# Patient Record
Sex: FEMALE | Race: WHITE | NOT HISPANIC OR LATINO | Employment: UNEMPLOYED | URBAN - NONMETROPOLITAN AREA
[De-identification: names, ages, dates, MRNs, and addresses within clinical notes are randomized per-mention and may not be internally consistent; named-entity substitution may affect disease eponyms.]

---

## 2017-01-04 ENCOUNTER — HOSPITAL ENCOUNTER (OUTPATIENT)
Dept: LAB | Facility: MEDICAL CENTER | Age: 52
End: 2017-01-04
Attending: COLON & RECTAL SURGERY
Payer: COMMERCIAL

## 2017-01-04 LAB
ANISOCYTOSIS BLD QL SMEAR: ABNORMAL
BASOPHILS # BLD AUTO: 0.05 K/UL (ref 0–0.12)
BASOPHILS NFR BLD AUTO: 0.7 % (ref 0–1.8)
COMMENT 1642: NORMAL
EOSINOPHIL # BLD: 0.14 K/UL (ref 0–0.51)
EOSINOPHIL NFR BLD AUTO: 1.9 % (ref 0–6.9)
ERYTHROCYTE [DISTWIDTH] IN BLOOD BY AUTOMATED COUNT: 51 FL (ref 35.9–50)
GIANT PLATELETS BLD QL SMEAR: NORMAL
HCT VFR BLD AUTO: 38.2 % (ref 37–47)
HGB BLD-MCNC: 11.4 G/DL (ref 12–16)
IMM GRANULOCYTES # BLD AUTO: 0.02 K/UL (ref 0–0.11)
IMM GRANULOCYTES NFR BLD AUTO: 0.3 % (ref 0–0.9)
LYMPHOCYTES # BLD: 2.16 K/UL (ref 1–4.8)
LYMPHOCYTES NFR BLD AUTO: 29.8 % (ref 22–41)
MCH RBC QN AUTO: 25.7 PG (ref 27–33)
MCHC RBC AUTO-ENTMCNC: 29.8 G/DL (ref 33.6–35)
MCV RBC AUTO: 86.2 FL (ref 81.4–97.8)
MICROCYTES BLD QL SMEAR: ABNORMAL
MONOCYTES # BLD: 0.56 K/UL (ref 0–0.85)
MONOCYTES NFR BLD AUTO: 7.7 % (ref 0–13.4)
MORPHOLOGY BLD-IMP: NORMAL
NEUTROPHILS # BLD: 4.33 K/UL (ref 2–7.15)
NEUTROPHILS NFR BLD AUTO: 59.6 % (ref 44–72)
NRBC # BLD AUTO: 0 K/UL
NRBC BLD-RTO: 0 /100 WBC
OVALOCYTES BLD QL SMEAR: NORMAL
PLATELET # BLD AUTO: 276 K/UL (ref 164–446)
PLATELET BLD QL SMEAR: NORMAL
PMV BLD AUTO: 11.7 FL (ref 9–12.9)
POIKILOCYTOSIS BLD QL SMEAR: NORMAL
RBC # BLD AUTO: 4.43 M/UL (ref 4.2–5.4)
RBC BLD AUTO: PRESENT
WBC # BLD AUTO: 7.3 K/UL (ref 4.8–10.8)

## 2017-01-04 PROCEDURE — 85025 COMPLETE CBC W/AUTO DIFF WBC: CPT

## 2017-01-04 PROCEDURE — 36415 COLL VENOUS BLD VENIPUNCTURE: CPT

## 2017-01-09 ENCOUNTER — APPOINTMENT (OUTPATIENT)
Dept: ADMISSIONS | Facility: MEDICAL CENTER | Age: 52
End: 2017-01-09
Attending: COLON & RECTAL SURGERY
Payer: COMMERCIAL

## 2017-01-09 RX ORDER — ESTRADIOL 0.5 MG/1
0.5 TABLET ORAL EVERY EVENING
COMMUNITY

## 2017-01-13 ENCOUNTER — HOSPITAL ENCOUNTER (OUTPATIENT)
Facility: MEDICAL CENTER | Age: 52
End: 2017-01-13
Attending: COLON & RECTAL SURGERY | Admitting: COLON & RECTAL SURGERY
Payer: COMMERCIAL

## 2017-01-13 VITALS
TEMPERATURE: 98.1 F | BODY MASS INDEX: 26.37 KG/M2 | DIASTOLIC BLOOD PRESSURE: 73 MMHG | RESPIRATION RATE: 16 BRPM | WEIGHT: 167.99 LBS | SYSTOLIC BLOOD PRESSURE: 127 MMHG | OXYGEN SATURATION: 95 % | HEART RATE: 101 BPM | HEIGHT: 67 IN

## 2017-01-13 PROBLEM — Z00.8 RECTAL EXAM: Status: ACTIVE | Noted: 2017-01-13

## 2017-01-13 LAB
ERYTHROCYTE [DISTWIDTH] IN BLOOD BY AUTOMATED COUNT: 47.5 FL (ref 35.9–50)
HCG SERPL QL: NEGATIVE
HCT VFR BLD AUTO: 37.7 % (ref 37–47)
HGB BLD-MCNC: 11.9 G/DL (ref 12–16)
MCH RBC QN AUTO: 25.6 PG (ref 27–33)
MCHC RBC AUTO-ENTMCNC: 31.6 G/DL (ref 33.6–35)
MCV RBC AUTO: 81.3 FL (ref 81.4–97.8)
PLATELET # BLD AUTO: 294 K/UL (ref 164–446)
PMV BLD AUTO: 11 FL (ref 9–12.9)
RBC # BLD AUTO: 4.64 M/UL (ref 4.2–5.4)
WBC # BLD AUTO: 7.6 K/UL (ref 4.8–10.8)

## 2017-01-13 PROCEDURE — 85027 COMPLETE CBC AUTOMATED: CPT

## 2017-01-13 PROCEDURE — 84703 CHORIONIC GONADOTROPIN ASSAY: CPT

## 2017-01-13 PROCEDURE — A9270 NON-COVERED ITEM OR SERVICE: HCPCS

## 2017-01-13 PROCEDURE — A4606 OXYGEN PROBE USED W OXIMETER: HCPCS | Performed by: COLON & RECTAL SURGERY

## 2017-01-13 PROCEDURE — 160046 HCHG PACU - 1ST 60 MINS PHASE II: Performed by: COLON & RECTAL SURGERY

## 2017-01-13 PROCEDURE — 160025 RECOVERY II MINUTES (STATS): Performed by: COLON & RECTAL SURGERY

## 2017-01-13 PROCEDURE — 160204 HCHG ENDO MINUTES - 1ST 30 MINS LEVEL 5: Performed by: COLON & RECTAL SURGERY

## 2017-01-13 PROCEDURE — 700102 HCHG RX REV CODE 250 W/ 637 OVERRIDE(OP)

## 2017-01-13 PROCEDURE — 160002 HCHG RECOVERY MINUTES (STAT): Performed by: COLON & RECTAL SURGERY

## 2017-01-13 PROCEDURE — 160047 HCHG PACU  - EA ADDL 30 MINS PHASE II: Performed by: COLON & RECTAL SURGERY

## 2017-01-13 PROCEDURE — 160048 HCHG OR STATISTICAL LEVEL 1-5: Performed by: COLON & RECTAL SURGERY

## 2017-01-13 PROCEDURE — 700111 HCHG RX REV CODE 636 W/ 250 OVERRIDE (IP)

## 2017-01-13 PROCEDURE — 160009 HCHG ANES TIME/MIN: Performed by: COLON & RECTAL SURGERY

## 2017-01-13 PROCEDURE — 110371 HCHG SHELL REV 272: Performed by: COLON & RECTAL SURGERY

## 2017-01-13 PROCEDURE — 700101 HCHG RX REV CODE 250

## 2017-01-13 PROCEDURE — 700111 HCHG RX REV CODE 636 W/ 250 OVERRIDE (IP): Performed by: COLON & RECTAL SURGERY

## 2017-01-13 PROCEDURE — 501629 HCHG TUBE, LUKI TRAP STERILE DISP: Performed by: COLON & RECTAL SURGERY

## 2017-01-13 PROCEDURE — 160035 HCHG PACU - 1ST 60 MINS PHASE I: Performed by: COLON & RECTAL SURGERY

## 2017-01-13 RX ORDER — SODIUM CHLORIDE, SODIUM LACTATE, POTASSIUM CHLORIDE, CALCIUM CHLORIDE 600; 310; 30; 20 MG/100ML; MG/100ML; MG/100ML; MG/100ML
INJECTION, SOLUTION INTRAVENOUS
Status: DISCONTINUED | OUTPATIENT
Start: 2017-01-13 | End: 2017-01-13 | Stop reason: HOSPADM

## 2017-01-13 RX ORDER — OXYCODONE HCL 5 MG/5 ML
SOLUTION, ORAL ORAL
Status: COMPLETED
Start: 2017-01-13 | End: 2017-01-13

## 2017-01-13 RX ORDER — MIDAZOLAM HYDROCHLORIDE 1 MG/ML
INJECTION INTRAMUSCULAR; INTRAVENOUS
Status: DISCONTINUED
Start: 2017-01-13 | End: 2017-01-13 | Stop reason: HOSPADM

## 2017-01-13 RX ORDER — LIDOCAINE HYDROCHLORIDE 10 MG/ML
INJECTION, SOLUTION INFILTRATION; PERINEURAL
Status: COMPLETED
Start: 2017-01-13 | End: 2017-01-13

## 2017-01-13 RX ADMIN — LIDOCAINE HYDROCHLORIDE 0.2 ML: 10 INJECTION, SOLUTION INFILTRATION; PERINEURAL at 08:15

## 2017-01-13 RX ADMIN — OXYCODONE HYDROCHLORIDE 10 MG: 5 SOLUTION ORAL at 09:23

## 2017-01-13 RX ADMIN — SODIUM CHLORIDE, POTASSIUM CHLORIDE, SODIUM LACTATE AND CALCIUM CHLORIDE: 600; 310; 30; 20 INJECTION, SOLUTION INTRAVENOUS at 08:45

## 2017-01-13 ASSESSMENT — PAIN SCALES - GENERAL
PAINLEVEL_OUTOF10: 5
PAINLEVEL_OUTOF10: 5
PAINLEVEL_OUTOF10: 9
PAINLEVEL_OUTOF10: 8
PAINLEVEL_OUTOF10: 5
PAINLEVEL_OUTOF10: 9

## 2017-01-13 NOTE — DISCHARGE INSTRUCTIONS
ENDOSCOPY HOME CARE INSTRUCTIONS    COLONOSCOPY  1. If you received a barium enema, take a mild laxative such as dulcolax, Jaleesa's M.O., or Milk of Magnesia to clean out the barium.  2. Drink plenty of fluids. Eat a diet high in fiber (whole-grain breads, fresh fruit and vegetables).  3. You may notice a few drops of blood with your first bowel movement. If you develop any large amount of bleeding, black stools, a fever, or abdominal pain, call your doctor right away.  5. Don't drive or drink alcohol for 24 hours. The medication you received will make you too drowsy.  6. No heavy lifting, no Aspirin products or Aspirin for 5 days  7. Additional instructions:  The anus and rectum appear to be healing within normal limits. Plan warm baths and keep the bowels soft and moving. Expect long period of time to heal and for pain to resolve due to opioid tolerance. The prep for the colonoscopy was not adequate to complete the colonoscopy. Please contact Dr. Hale's office to schedule a Barium enema study as a substitute in the next 2 months. Please call [phone] for followup appointment in 4 weeks.   MD Hale [phone]    You should call [phone] if you develop problems with breathing or chest pain.  If any questions arise, call your doctor. If your doctor is not available, please feel free to call [phone]. You can also call the HEALTH HOTLINE open 24 hours/day, 7 days/week and speak to a nurse at [phone], or toll free [phone].    Depression / Suicide Risk    As you are discharged from this [facility] facility, it is important to learn how to keep safe from harming yourself.    Recognize the warning signs:  · Abrupt changes in personality, positive or negative- including increase in energy   · Giving away possessions  · Change in eating patterns- significant weight changes-  positive or negative  · Change in sleeping patterns- unable to sleep or sleeping all the time   · Unwillingness or inability to  communicate  · Depression  · Unusual sadness, discouragement and loneliness  · Talk of wanting to die  · Neglect of personal appearance   · Rebelliousness- reckless behavior  · Withdrawal from people/activities they love  · Confusion- inability to concentrate     If you or a loved one observes any of these behaviors or has concerns about self-harm, here's what you can do:  · Talk about it- your feelings and reasons for harming yourself  · Remove any means that you might use to hurt yourself (examples: pills, rope, extension cords, firearm)  · Get professional help from the community (Mental Health, Substance Abuse, psychological counseling)  · Do not be alone:Call your Safe Contact- someone whom you trust who will be there for you.  · Call your local CRISIS HOTLINE 699-2740 or 651-437-3618  · Call your local Children's Mobile Crisis Response Team [place] (639) 936-0977 or www.[employer]  · Call the toll free National Suicide Prevention Hotlines   · National Suicide Prevention Lifeline 329-836-GVLV (3169)  · National Hope Line Network 800-SUICIDE (535-8408)    I acknowledge receipt and understanding of these Home Care Instructions.

## 2017-01-13 NOTE — OR NURSING
0903 To PACU from OR on [facility]. All bedside rails up for safe transfer. Sleeping with lma in place. Even non labored breathing. Skin pink warm dry. Lungs clear bilaterally.   0908 lma removed, spontaneous even non labored breathing. Skin pink warm dry. arousable and calling. C/o chronic abdominal pain, plan to medicate. Denies nausea.   0915 awake and alert.   0930 tolerated sips of water.   0940 Deep breathing. Strong cough. IV Fluids open.  0957 pt meets criteria for stage 2. Report given to katrina nicholson

## 2017-01-13 NOTE — IP AVS SNAPSHOT
" After Visit Summary                                                                                                                Name:Selina Piper  Medical Record Number:[id number]  CSN: [id number]    YOB: 1965   Age: 51 y.o.  Sex: female  HT:1.714 m (5' 7.48\") WT: 76.2 kg (167 lb 15.9 oz)          Admit Date: 1/13/2017     Discharge Date:   Today's Date: 1/13/2017  Attending Doctor:  Jg Hale M.D.                  Allergies:  Tape                Discharge Instructions       ENDOSCOPY HOME CARE INSTRUCTIONS    COLONOSCOPY  1. If you received a barium enema, take a mild laxative such as dulcolax, Jaleesa's M.O., or Milk of Magnesia to clean out the barium.  2. Drink plenty of fluids. Eat a diet high in fiber (whole-grain breads, fresh fruit and vegetables).  3. You may notice a few drops of blood with your first bowel movement. If you develop any large amount of bleeding, black stools, a fever, or abdominal pain, call your doctor right away.  5. Don't drive or drink alcohol for 24 hours. The medication you received will make you too drowsy.  6. No heavy lifting, no Aspirin products or Aspirin for 5 days  7. Additional instructions:  The anus and rectum appear to be healing within normal limits. Plan warm baths and keep the bowels soft and moving. Expect long period of time to heal and for pain to resolve due to opioid tolerance. The prep for the colonoscopy was not adequate to complete the colonoscopy. Please contact Dr. Hale's office to schedule a Barium enema study as a substitute in the next 2 months. Please call [phone] for followup appointment in 4 weeks.   MD Hale [phone]    You should call [phone] if you develop problems with breathing or chest pain.  If any questions arise, call your doctor. If your doctor is not available, please feel free to call [phone]. You can also call the [employer] HOTLINE open 24 hours/day, 7 days/week and speak to a nurse at [phone], or toll free " [phone].    Depression / Suicide Risk    As you are discharged from this [facility] facility, it is important to learn how to keep safe from harming yourself.    Recognize the warning signs:  · Abrupt changes in personality, positive or negative- including increase in energy   · Giving away possessions  · Change in eating patterns- significant weight changes-  positive or negative  · Change in sleeping patterns- unable to sleep or sleeping all the time   · Unwillingness or inability to communicate  · Depression  · Unusual sadness, discouragement and loneliness  · Talk of wanting to die  · Neglect of personal appearance   · Rebelliousness- reckless behavior  · Withdrawal from people/activities they love  · Confusion- inability to concentrate     If you or a loved one observes any of these behaviors or has concerns about self-harm, here's what you can do:  · Talk about it- your feelings and reasons for harming yourself  · Remove any means that you might use to hurt yourself (examples: pills, rope, extension cords, firearm)  · Get professional help from the community (Mental Health, Substance Abuse, psychological counseling)  · Do not be alone:Call your Safe Contact- someone whom you trust who will be there for you.  · Call your local CRISIS HOTLINE 206-8616 or 686-030-1403  · Call your local Children's Mobile Crisis Response Team [place] (527) 377-6910 or www.Currently  · Call the toll free National Suicide Prevention Hotlines   · National Suicide Prevention Lifeline 396-316-PVVZ (6868)  · National Hope Line Network 800-SUICIDE (025-8886)    I acknowledge receipt and understanding of these Home Care Instructions.       Medication List      CONTINUE taking these medications        Instructions    alprazolam 1 MG Tabs   Commonly known as:  XANAX    Take 1 mg by mouth at bedtime as needed for Sleep.   Dose:  1 mg       amitriptyline 10 MG Tabs   Commonly known as:  ELAVIL    Take 20 mg by mouth  every evening.   Dose:  20 mg       clonazepam 0.5 MG Tabs   Commonly known as:  KLONOPIN    Take 0.25 mg by mouth every evening.   Dose:  0.25 mg       ESTRACE 0.5 MG tablet   Generic drug:  estradiol    Take 0.5 mg by mouth every evening.   Dose:  0.5 mg       fluoxetine 20 MG Caps   Commonly known as:  PROZAC    Take 20 mg by mouth every evening.   Dose:  20 mg       MORPHINE SULFATE    Take 30 mg by mouth as needed. Taking 3 pills as needed   Dose:  30 mg       PRILOSEC 20 MG delayed-release capsule   Generic drug:  omeprazole    Take  by mouth every evening. Once daily               Medication Information     Above and/or attached are the medications your physician expects you to take upon discharge. Review all of your home medications and newly ordered medications with your doctor and/or pharmacist. Follow medication instructions as directed by your doctor and/or pharmacist. Please keep your medication list with you and share with your physician. Update the information when medications are discontinued, doses are changed, or new medications (including over-the-counter products) are added; and carry medication information at all times in the event of emergency situations.        Resources     Quit Smoking / Tobacco Use:    I understand the use of any tobacco products increases my chance of suffering from future heart disease or stroke and could cause other illnesses which may shorten my life. Quitting the use of tobacco products is the single most important thing I can do to improve my health. For further information on smoking / tobacco cessation call a Toll Free Quit Line at 1-998.131.3045 (*National Cancer [place]) or 1-689.505.4275 (American Lung Association) or you can access the web based program at www.lungusa.org.    [place] Tobacco Users Help Line:  (187) 198-2930       Toll Free: 1-128.379.4792  Quit Tobacco Program [facility] (331)734-8476    Crisis Hotline:    National Crisis  Hotline:  8-838-KBEWOUT or 1-416.240.5063    [place] Crisis Hotline:    1-102.882.1055 or 014-511-4799    Discharge Survey:   Thank you for choosing [facility]. We hope we did everything we could to make your hospital stay a pleasant one. You may be receiving a survey and we would appreciate your time and participation in answering the questions. Your input is very valuable to us in our efforts to improve our service to our patients and their families.            Signatures     My signature on this form indicates that:    1. I acknowledge receipt and understanding of these Home Care Instruction.  2. My questions regarding this information have been answered to my satisfaction.  3. I have formulated a plan with my discharge nurse to obtain my prescribed medications for home.    __________________________________      __________________________________                   Patient Signature                                 Guardian/Responsible Adult Signature      __________________________________                 __________       ________                       Nurse Signature                                               Date                 Time

## 2017-01-13 NOTE — OR NURSING
1000: Pt states does not want family brought back to stage 2 or have him sign for instructions.  1030: Wheeled out to home

## 2017-01-13 NOTE — OP REPORT
DATE OF SERVICE:  01/13/2017    PREOPERATIVE DIAGNOSES:  1.  Rectal bleeding.  2.  History of anemia.  3.  Anal pain and bleeding.  4.  History of hemorrhoidectomy.  5.  History of incomplete colonoscopy due to inadequate prep.  6.  Opioid tolerance.    POSTOPERATIVE DIAGNOSES:  1.  Incomplete colonoscopy due to poor prep.  2.  Anus and rectum healing within normal limits.    OPERATION PERFORMED:  1.  Examination under anesthesia.  2.  Colonoscopy to transverse colon, aborted due to insufficient prep.    SURGEON:  Jg Hale MD    ANESTHESIOLOGIST:  Sherwin Bettencourt MD    INDICATIONS FOR PROCEDURE:  The patient is a 51-year-old female with a history   of mild anemia, opioid tolerance, rectal bleeding, who underwent a   hemorrhoidectomy procedure and has had persistent pain.  She also had an   incomplete colonoscopy due to poor prep and comes today for attempted repeat   colonoscopy and a reexamination under anesthesia.    DETAILS OF PROCEDURE:  After an extensive informed consent discussion and   process, the patient was brought to the operating room.  She was placed in a   supine position on the operating table.  After induction of general   anesthesia, she was repositioned into lithotomy with Yellofin stirrups,   sequential compression stockings and appropriate padding.  The anorectal   region was prepped and draped in usual fashion.  After administration of   intravenous antibiotics, a careful examination under anesthesia was performed.    This demonstrated the prior hemorrhoidectomy sites were healing within   normal limits.  There is no abscess and no evident cause for unusual pain or   bleeding.  There was no fistula and no evident fissure forming.  The   colonoscope was introduced through the well lubricated anus.  There was   already brown stool present in the rectum, which continued to appear.    The colonoscope was advanced through the sigmoid colon to what appeared to be   the splenic flexure.  A glimpse  of the transverse colon could be seen.    However, all along the way, there was solid and semi-solid fecal material and   particulate fecal material, which followed the scope and made the view quite   poor.  This only became worse as we moved more proximally.  Due to the poor   prep, the colonoscope was slowly withdrawn.  Irrigation was performed.  A   reasonable view was obtained of the more distal aspect of the colon and   rectum, which appeared normal.    IMPRESSION:  1.  Incomplete colonoscopy and inadequate prep.  2.  Anorectal area healing within normal limits following previous hemorrhoid   procedure, no evidence of abscess or other abnormality.    PLAN:  Continued conservative management and plan for future barium enema.       ____________________________________     MD SHEELA HICKMAN / OMAIRA    DD:  01/13/2017 09:07:49  DT:  01/13/2017 09:41:11    D#:  [id number]  Job#:  [id number]

## 2017-01-13 NOTE — IP AVS SNAPSHOT
1/13/2017          Selina Piper  [id number]  [place] [address]    Dear Selina:    [facility] wants to ensure your discharge home is safe and you or your loved ones have had all your questions answered regarding your care after you leave the hospital.    You may receive a telephone call within two days of your discharge.  This call is to make certain you understand your discharge instructions as well as ensure we provided you with the best care possible during your stay with us.     The call will only last approximately 3-5 minutes and will be done by a nurse.    Once again, we want to ensure your discharge home is safe and that you have a clear understanding of any next steps in your care.  If you have any questions or concerns, please do not hesitate to contact us, we are here for you.  Thank you for choosing [facility] for your healthcare needs.    Sincerely,    Ministerio Swanson    [facility]

## (undated) DEVICE — LACTATED RINGERS INJ 1000 ML - (14EA/CA 60CA/PF)

## (undated) DEVICE — ELECTRODE DUAL RETURN W/ CORD - (50/PK)

## (undated) DEVICE — PAD PREP 24 X 48 CUFFED - (100/CA)

## (undated) DEVICE — CANISTER SUCTION RIGID RED 1500CC (40EA/CA)

## (undated) DEVICE — SYRINGE DISP. 50CC LS - (40/BX)

## (undated) DEVICE — SET EXTENSION WITH 2 PORTS (48EA/CA) ***PART #2C8610 IS A SUBSTITUTE*****

## (undated) DEVICE — SPONGE GAUZE NON-STERILE 4X4 - (2000/CA 10PK/CA)

## (undated) DEVICE — CANNULA W/ SUPPLY TUBING O2 - (50/CA)

## (undated) DEVICE — GLOVE, LITE (PAIR)

## (undated) DEVICE — SENSOR SPO2 ADULT LNCS ADTX (20/BX) ORDER ITEM #19593

## (undated) DEVICE — BASIN EMESIS DISP. - (250/CA)

## (undated) DEVICE — TUBING CLEARLINK DUO-VENT - C-FLO (48EA/CA)

## (undated) DEVICE — MASK, LARYNGEAL AIRWAY #4

## (undated) DEVICE — TUBE CONNECTING SUCTION - CLEAR PLASTIC STERILE 72 IN (50EA/CA)

## (undated) DEVICE — GOWN SURGEONS X-LARGE - DISP. (30/CA)

## (undated) DEVICE — KIT  I.V. START (100EA/CA)

## (undated) DEVICE — ELECTRODE 850 FOAM ADHESIVE - HYDROGEL RADIOTRNSPRNT (50/PK)

## (undated) DEVICE — TRAP POLYP E-TRAP (25EA/BX)